# Patient Record
Sex: FEMALE | Race: WHITE | NOT HISPANIC OR LATINO
[De-identification: names, ages, dates, MRNs, and addresses within clinical notes are randomized per-mention and may not be internally consistent; named-entity substitution may affect disease eponyms.]

---

## 2023-06-22 ENCOUNTER — TRANSCRIPTION ENCOUNTER (OUTPATIENT)
Age: 23
End: 2023-06-22

## 2023-06-22 ENCOUNTER — APPOINTMENT (OUTPATIENT)
Dept: ORTHOPEDIC SURGERY | Facility: CLINIC | Age: 23
End: 2023-06-22
Payer: COMMERCIAL

## 2023-06-22 VITALS — WEIGHT: 115 LBS | HEIGHT: 61 IN | BODY MASS INDEX: 21.71 KG/M2

## 2023-06-22 PROBLEM — Z00.00 ENCOUNTER FOR PREVENTIVE HEALTH EXAMINATION: Status: ACTIVE | Noted: 2023-06-22

## 2023-06-22 PROCEDURE — 73562 X-RAY EXAM OF KNEE 3: CPT | Mod: RT

## 2023-06-22 PROCEDURE — 99203 OFFICE O/P NEW LOW 30 MIN: CPT

## 2023-06-22 RX ORDER — IBUPROFEN 600 MG/1
600 TABLET, FILM COATED ORAL TWICE DAILY
Qty: 60 | Refills: 0 | Status: ACTIVE | COMMUNITY
Start: 2023-06-22 | End: 1900-01-01

## 2023-06-22 NOTE — IMAGING
[de-identified] : On examination of the right knee no evidence of knee effusion, no ecchymosis, no erythema.  Skin is intact.  No tenderness along the medial or lateral joint line.  She states when she has a discomfort its around the lateral patella facet.  No tenderness over the patella tendon or quadricep tendon today.  Patient is able to straight leg raise.  She is able to actively flex and extend her knee.  She has no pain through terminal knee flexion or extension.  Negative Yung's today.  Felt stable to varus and valgus stress testing.  Calf is soft.

## 2023-06-22 NOTE — ASSESSMENT
[FreeTextEntry1] : At this time we discussed all options including conservative treatment with anti-inflammatory, physical therapy, cortisone injection, rest and activity modification.  Prescription was given for physical therapy.  I will send ibuprofen 600 to the pharmacy.  She kindly declined a cortisone injection.  We will have her follow-up in several weeks for repeat evaluation if the pain worsens or continues we may consider further imaging with MRI.

## 2023-06-22 NOTE — HISTORY OF PRESENT ILLNESS
[de-identified] : 22-year-old female comes in today for an evaluation of her right knee pain that has been going on now since May 6.  Patient states that she began to feel the pain while running.  She describes a tightness and stiffness in the knee.  Has tried resting still having discomfort.  She states the pain is intermittent mainly with activity.

## 2023-07-20 ENCOUNTER — APPOINTMENT (OUTPATIENT)
Dept: ORTHOPEDIC SURGERY | Facility: CLINIC | Age: 23
End: 2023-07-20

## 2023-10-24 ENCOUNTER — NON-APPOINTMENT (OUTPATIENT)
Age: 23
End: 2023-10-24

## 2024-02-29 ENCOUNTER — APPOINTMENT (OUTPATIENT)
Dept: ORTHOPEDIC SURGERY | Facility: CLINIC | Age: 24
End: 2024-02-29
Payer: COMMERCIAL

## 2024-02-29 DIAGNOSIS — M25.561 PAIN IN RIGHT KNEE: ICD-10-CM

## 2024-02-29 PROCEDURE — 99213 OFFICE O/P EST LOW 20 MIN: CPT

## 2024-02-29 NOTE — IMAGING
[de-identified] : On examination of the right knee no significant swelling, no ecchymosis, no erythema.  Skin is intact.  No specific joint line tenderness.  Mild crepitus through range of motion through knee flexion and extension.  Previous x-ray right knee June 2023 no bony abnormality

## 2024-02-29 NOTE — ASSESSMENT
[FreeTextEntry1] : At this time patient has been experiencing pain for approximately 8 months and her knee mainly with activity and with running.  She has done a course of physical therapy and despite that still has pain.  Will put in a request for an MRI right knee.  She will follow-up in our sports medicine department after MRI is completed.

## 2024-02-29 NOTE — HISTORY OF PRESENT ILLNESS
[de-identified] : 23-year-old female comes in today for repeat evaluation subsequent encounter of her right knee pain.  Patient is very active she is a runner.  She was last seen June 2023 for knee pain, we sent her for physical therapy.  She has done approximately 10 sessions of physical therapy in [place], although continues to experience pain specifically with running and after running.  She is still currently in physical therapy. She did take the ibuprofen 600 mg that was prescribed.

## 2024-03-27 ENCOUNTER — APPOINTMENT (OUTPATIENT)
Dept: ORTHOPEDIC SURGERY | Facility: CLINIC | Age: 24
End: 2024-03-27
Payer: COMMERCIAL

## 2024-03-27 PROCEDURE — 99213 OFFICE O/P EST LOW 20 MIN: CPT

## 2024-03-27 NOTE — HISTORY OF PRESENT ILLNESS
[de-identified] : cc rt knee pain.   23-year-old female presents acute exacerbation of chronic right knee pain. 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 She states on the run she felt discomfort and that she could not walk after. She states she has been attending formal physical therapy in [place]. She states she has not been able to run for more than 10 minutes due to pain and her knee locking. She states she had ibuprofen for the pain.  She also states she has slight hip pain on her right hip.   MRI of the right knee shows small undersurface tear posterior horn medial meniscus or lateral meniscus is probably at a typo in the report seen only on sagittal imaging.   X-rays from prior visit reviewed showing no soft tissue calcifications well-maintained joint spaces  on exam right knee is no effusion full range of motion stable ligaments nontender over the joint lines some patellofemoral crepitus equal popliteal angle 70 degrees bilaterally  recommending dropping of MRI disc. Blood work for Lymes Disease, rheumatoid factor, anti-nuclear anti-body. Did discuss surgery if blood work comes back negative. Will follow up in 4-6 weeks.    Surgical Discussion (general)  The patient was advised of the diagnosis. The natural history of the pathology was explained in full to the patient in layman's terms. All questions were answered. The risks and benefits of surgical and non-surgical treatment alternatives were explained in full to the patient.   The patient demonstrated a full understanding of the surgical and non-surgical options. The risks of surgery were outlined in full to the patient including but not limited to bleeding, scarring, infection, sepsis, neurologic injury, vascular injury, failure to resolve symptoms, symptom recurrence, the need for further-surgery, non-healing, wound breakdown, deep vein thrombosis, pulmonary embolism, spontaneous osteonecrosis, anesthesia complications and even death. The patient understood all the risks and accepted them and understood that other complications could occur that are not mentioned above. The intraoperative plan, post-operative plan, post-operative expectations and limitations were explained in full. Expectations from non-surgical treatment were explained in full as well. The patient demonstrated a complete understanding of the treatment alternatives and requested the above-mentioned procedure. This will be scheduled accordingly.

## 2024-04-18 ENCOUNTER — NON-APPOINTMENT (OUTPATIENT)
Age: 24
End: 2024-04-18

## 2024-04-24 ENCOUNTER — APPOINTMENT (OUTPATIENT)
Dept: ORTHOPEDIC SURGERY | Facility: CLINIC | Age: 24
End: 2024-04-24
Payer: COMMERCIAL

## 2024-04-24 PROCEDURE — 99214 OFFICE O/P EST MOD 30 MIN: CPT

## 2024-04-24 NOTE — HISTORY OF PRESENT ILLNESS
[de-identified] : cc right knee pain.   23-year-old female presents acute exacerbation of chronic of right knee pain. MRI shows no tears but plica on my review official report says there is a medial meniscal tear inferior aspect. She is a runner. She states that she feels interior pain. She is attending formal physical therapy. She states due to the pain and discomfort, she cannot run points to the anterolateral aspect as the main source of pain on the patella.  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 The natural history of the pathology was explained in full to the patient in layman's terms. All questions were answered. The risks and benefits of surgical and non-surgical treatment alternatives were explained in full to the patient.   The patient demonstrated a full understanding of the surgical and non-surgical options. The risks of surgery were outlined in full to the patient including but not limited to bleeding, scarring, infection, sepsis, neurologic injury, vascular injury, failure to resolve symptoms, symptom recurrence, the need for further-surgery, non-healing, wound breakdown, deep vein thrombosis, pulmonary embolism, spontaneous osteonecrosis, anesthesia complications and even death. The patient understood all the risks and accepted them and understood that other complications could occur that are not mentioned above. The intraoperative plan, post-operative plan, post-operative expectations and limitations were explained in full. Expectations from non-surgical treatment were explained in full as well. The patient demonstrated a complete understanding of the treatment alternatives and requested the above-mentioned procedure. This will be scheduled accordingly.

## 2024-05-08 RX ORDER — TRAMADOL HYDROCHLORIDE 50 MG/1
50 TABLET, COATED ORAL
Qty: 30 | Refills: 0 | Status: ACTIVE | COMMUNITY
Start: 2024-05-08 | End: 1900-01-01

## 2024-05-10 ENCOUNTER — APPOINTMENT (OUTPATIENT)
Dept: ORTHOPEDIC SURGERY | Facility: AMBULATORY SURGERY CENTER | Age: 24
End: 2024-05-10
Payer: COMMERCIAL

## 2024-05-10 DIAGNOSIS — G89.29 PAIN IN RIGHT KNEE: ICD-10-CM

## 2024-05-10 DIAGNOSIS — M25.561 PAIN IN RIGHT KNEE: ICD-10-CM

## 2024-05-10 PROCEDURE — 29876 ARTHRS KNEE SURG SYNVCT MAJ: CPT | Mod: RT

## 2024-05-10 NOTE — PROCEDURE
[FreeTextEntry3] : rt knee scope msi , plica removal , chondro lat facet patella ,sub q sutures, no suture removal needed, rehab stretch and strength 1st post op visit [id number]

## 2024-05-15 ENCOUNTER — APPOINTMENT (OUTPATIENT)
Dept: ORTHOPEDIC SURGERY | Facility: CLINIC | Age: 24
End: 2024-05-15
Payer: COMMERCIAL

## 2024-05-15 VITALS — WEIGHT: 120 LBS | HEIGHT: 61 IN | BODY MASS INDEX: 22.66 KG/M2

## 2024-05-15 DIAGNOSIS — M67.51 PLICA SYNDROME, RIGHT KNEE: ICD-10-CM

## 2024-05-15 PROCEDURE — 99024 POSTOP FOLLOW-UP VISIT: CPT

## 2024-05-15 NOTE — IMAGING
[de-identified] : Physical exam the right knee: Trace effusion.  No erythema or ecchymosis.  Surgical portals are healing well.  Clean, dry, intact.  No surrounding erythema or drainage noted.  Full extension, flexion to 100 degrees.  No calf pain, negative Homans' sign.  Intact to light touch, she is ambulating today with crutches.

## 2024-05-15 NOTE — HISTORY OF PRESENT ILLNESS
[de-identified] : The patient is a 23-year-old female here for a subsequent reevaluation of her right knee.  She is status post right knee arthroscopy, excision of plica and chondroplasty lateral patella facet on 5/10/2024.  This is her initial postoperative visit.  Her knee is doing well.

## 2024-05-15 NOTE — DISCUSSION/SUMMARY
[de-identified] : Today she was advised to discontinue the crutches.  She can be weightbearing as tolerated.  No sutures were removed since she had subcuticular suture for closure of the portals.  She is going to [facility] for formal physical therapy, Rx provided for that.  She was advised to remain out of running for 6 to 8 weeks.  She was advised by me that she can use an exercise bicycle.  I will see her back in 2 months for further evaluation.

## 2024-07-16 ENCOUNTER — APPOINTMENT (OUTPATIENT)
Dept: ORTHOPEDIC SURGERY | Facility: CLINIC | Age: 24
End: 2024-07-16